# Patient Record
Sex: FEMALE | Race: WHITE | NOT HISPANIC OR LATINO
[De-identification: names, ages, dates, MRNs, and addresses within clinical notes are randomized per-mention and may not be internally consistent; named-entity substitution may affect disease eponyms.]

---

## 2018-08-15 ENCOUNTER — ASOB RESULT (OUTPATIENT)
Age: 28
End: 2018-08-15

## 2018-08-15 ENCOUNTER — APPOINTMENT (OUTPATIENT)
Dept: ANTEPARTUM | Facility: CLINIC | Age: 28
End: 2018-08-15
Payer: MEDICAID

## 2018-08-15 PROCEDURE — 76811 OB US DETAILED SNGL FETUS: CPT

## 2018-08-15 PROCEDURE — 76805 OB US >/= 14 WKS SNGL FETUS: CPT

## 2018-09-05 ENCOUNTER — TRANSCRIPTION ENCOUNTER (OUTPATIENT)
Age: 28
End: 2018-09-05

## 2018-09-06 ENCOUNTER — INPATIENT (INPATIENT)
Facility: HOSPITAL | Age: 28
LOS: 1 days | Discharge: HOME | End: 2018-09-08
Attending: SPECIALIST | Admitting: SPECIALIST

## 2018-09-06 VITALS
DIASTOLIC BLOOD PRESSURE: 51 MMHG | RESPIRATION RATE: 18 BRPM | TEMPERATURE: 99 F | SYSTOLIC BLOOD PRESSURE: 100 MMHG | HEART RATE: 100 BPM | OXYGEN SATURATION: 100 %

## 2018-09-06 DIAGNOSIS — Z3A.00 WEEKS OF GESTATION OF PREGNANCY NOT SPECIFIED: ICD-10-CM

## 2018-09-06 DIAGNOSIS — O42.10 PREMATURE RUPTURE OF MEMBRANES, ONSET OF LABOR MORE THAN 24 HOURS FOLLOWING RUPTURE, UNSPECIFIED WEEKS OF GESTATION: ICD-10-CM

## 2018-09-06 LAB
BASOPHILS # BLD AUTO: 0.01 K/UL — SIGNIFICANT CHANGE UP (ref 0–0.2)
BASOPHILS NFR BLD AUTO: 0.1 % — SIGNIFICANT CHANGE UP (ref 0–2)
BLD GP AB SCN SERPL QL: NEGATIVE — SIGNIFICANT CHANGE UP
EOSINOPHIL # BLD AUTO: 0.07 K/UL — SIGNIFICANT CHANGE UP (ref 0–0.5)
EOSINOPHIL NFR BLD AUTO: 1 % — SIGNIFICANT CHANGE UP (ref 0–6)
HCT VFR BLD CALC: 39.6 % — SIGNIFICANT CHANGE UP (ref 34.5–45)
HGB BLD-MCNC: 13.1 G/DL — SIGNIFICANT CHANGE UP (ref 11.5–15.5)
IMM GRANULOCYTES # BLD AUTO: 0.02 # — SIGNIFICANT CHANGE UP
IMM GRANULOCYTES NFR BLD AUTO: 0.3 % — SIGNIFICANT CHANGE UP (ref 0–1.5)
LYMPHOCYTES # BLD AUTO: 1.42 K/UL — SIGNIFICANT CHANGE UP (ref 1–3.3)
LYMPHOCYTES # BLD AUTO: 19.7 % — SIGNIFICANT CHANGE UP (ref 13–44)
MCHC RBC-ENTMCNC: 31.1 PG — SIGNIFICANT CHANGE UP (ref 27–34)
MCHC RBC-ENTMCNC: 33.1 % — SIGNIFICANT CHANGE UP (ref 32–36)
MCV RBC AUTO: 94.1 FL — SIGNIFICANT CHANGE UP (ref 80–100)
MONOCYTES # BLD AUTO: 0.64 K/UL — SIGNIFICANT CHANGE UP (ref 0–0.9)
MONOCYTES NFR BLD AUTO: 8.9 % — SIGNIFICANT CHANGE UP (ref 2–14)
NEUTROPHILS # BLD AUTO: 5.06 K/UL — SIGNIFICANT CHANGE UP (ref 1.8–7.4)
NEUTROPHILS NFR BLD AUTO: 70 % — SIGNIFICANT CHANGE UP (ref 43–77)
NRBC # FLD: 0 — SIGNIFICANT CHANGE UP
PLATELET # BLD AUTO: 127 K/UL — LOW (ref 150–400)
PMV BLD: 12.6 FL — SIGNIFICANT CHANGE UP (ref 7–13)
RBC # BLD: 4.21 M/UL — SIGNIFICANT CHANGE UP (ref 3.8–5.2)
RBC # FLD: 12.5 % — SIGNIFICANT CHANGE UP (ref 10.3–14.5)
RH IG SCN BLD-IMP: POSITIVE — SIGNIFICANT CHANGE UP
T PALLIDUM AB TITR SER: NEGATIVE — SIGNIFICANT CHANGE UP
WBC # BLD: 7.22 K/UL — SIGNIFICANT CHANGE UP (ref 3.8–10.5)
WBC # FLD AUTO: 7.22 K/UL — SIGNIFICANT CHANGE UP (ref 3.8–10.5)

## 2018-09-06 RX ORDER — PRAMOXINE HYDROCHLORIDE 150 MG/15G
1 AEROSOL, FOAM RECTAL EVERY 4 HOURS
Qty: 0 | Refills: 0 | Status: DISCONTINUED | OUTPATIENT
Start: 2018-09-06 | End: 2018-09-08

## 2018-09-06 RX ORDER — AER TRAVELER 0.5 G/1
1 SOLUTION RECTAL; TOPICAL EVERY 4 HOURS
Qty: 0 | Refills: 0 | Status: DISCONTINUED | OUTPATIENT
Start: 2018-09-06 | End: 2018-09-08

## 2018-09-06 RX ORDER — AMPICILLIN TRIHYDRATE 250 MG
2 CAPSULE ORAL ONCE
Qty: 0 | Refills: 0 | Status: COMPLETED | OUTPATIENT
Start: 2018-09-06 | End: 2018-09-06

## 2018-09-06 RX ORDER — AMPICILLIN TRIHYDRATE 250 MG
1 CAPSULE ORAL EVERY 4 HOURS
Qty: 0 | Refills: 0 | Status: DISCONTINUED | OUTPATIENT
Start: 2018-09-06 | End: 2018-09-06

## 2018-09-06 RX ORDER — SODIUM CHLORIDE 9 MG/ML
3 INJECTION INTRAMUSCULAR; INTRAVENOUS; SUBCUTANEOUS EVERY 8 HOURS
Qty: 0 | Refills: 0 | Status: DISCONTINUED | OUTPATIENT
Start: 2018-09-06 | End: 2018-09-06

## 2018-09-06 RX ORDER — ACETAMINOPHEN 500 MG
975 TABLET ORAL EVERY 6 HOURS
Qty: 0 | Refills: 0 | Status: COMPLETED | OUTPATIENT
Start: 2018-09-06 | End: 2019-08-05

## 2018-09-06 RX ORDER — DIPHENHYDRAMINE HCL 50 MG
25 CAPSULE ORAL EVERY 6 HOURS
Qty: 0 | Refills: 0 | Status: DISCONTINUED | OUTPATIENT
Start: 2018-09-06 | End: 2018-09-08

## 2018-09-06 RX ORDER — KETOROLAC TROMETHAMINE 30 MG/ML
30 SYRINGE (ML) INJECTION ONCE
Qty: 0 | Refills: 0 | Status: DISCONTINUED | OUTPATIENT
Start: 2018-09-06 | End: 2018-09-06

## 2018-09-06 RX ORDER — HYDROCORTISONE 1 %
1 OINTMENT (GRAM) TOPICAL EVERY 4 HOURS
Qty: 0 | Refills: 0 | Status: DISCONTINUED | OUTPATIENT
Start: 2018-09-06 | End: 2018-09-06

## 2018-09-06 RX ORDER — GLYCERIN ADULT
1 SUPPOSITORY, RECTAL RECTAL AT BEDTIME
Qty: 0 | Refills: 0 | Status: DISCONTINUED | OUTPATIENT
Start: 2018-09-06 | End: 2018-09-08

## 2018-09-06 RX ORDER — AMPICILLIN TRIHYDRATE 250 MG
CAPSULE ORAL
Qty: 0 | Refills: 0 | Status: DISCONTINUED | OUTPATIENT
Start: 2018-09-06 | End: 2018-09-06

## 2018-09-06 RX ORDER — PRAMOXINE HYDROCHLORIDE 150 MG/15G
1 AEROSOL, FOAM RECTAL EVERY 4 HOURS
Qty: 0 | Refills: 0 | Status: DISCONTINUED | OUTPATIENT
Start: 2018-09-06 | End: 2018-09-06

## 2018-09-06 RX ORDER — OXYTOCIN 10 UNIT/ML
333.33 VIAL (ML) INJECTION
Qty: 20 | Refills: 0 | Status: DISCONTINUED | OUTPATIENT
Start: 2018-09-06 | End: 2018-09-06

## 2018-09-06 RX ORDER — TETANUS TOXOID, REDUCED DIPHTHERIA TOXOID AND ACELLULAR PERTUSSIS VACCINE, ADSORBED 5; 2.5; 8; 8; 2.5 [IU]/.5ML; [IU]/.5ML; UG/.5ML; UG/.5ML; UG/.5ML
0.5 SUSPENSION INTRAMUSCULAR ONCE
Qty: 0 | Refills: 0 | Status: DISCONTINUED | OUTPATIENT
Start: 2018-09-06 | End: 2018-09-08

## 2018-09-06 RX ORDER — HYDROCORTISONE 1 %
1 OINTMENT (GRAM) TOPICAL EVERY 4 HOURS
Qty: 0 | Refills: 0 | Status: DISCONTINUED | OUTPATIENT
Start: 2018-09-06 | End: 2018-09-08

## 2018-09-06 RX ORDER — DIBUCAINE 1 %
1 OINTMENT (GRAM) RECTAL EVERY 4 HOURS
Qty: 0 | Refills: 0 | Status: DISCONTINUED | OUTPATIENT
Start: 2018-09-06 | End: 2018-09-06

## 2018-09-06 RX ORDER — IBUPROFEN 200 MG
600 TABLET ORAL EVERY 6 HOURS
Qty: 0 | Refills: 0 | Status: DISCONTINUED | OUTPATIENT
Start: 2018-09-06 | End: 2018-09-08

## 2018-09-06 RX ORDER — AER TRAVELER 0.5 G/1
1 SOLUTION RECTAL; TOPICAL EVERY 4 HOURS
Qty: 0 | Refills: 0 | Status: DISCONTINUED | OUTPATIENT
Start: 2018-09-06 | End: 2018-09-06

## 2018-09-06 RX ORDER — DOCUSATE SODIUM 100 MG
100 CAPSULE ORAL
Qty: 0 | Refills: 0 | Status: DISCONTINUED | OUTPATIENT
Start: 2018-09-06 | End: 2018-09-08

## 2018-09-06 RX ORDER — ACETAMINOPHEN 500 MG
975 TABLET ORAL EVERY 6 HOURS
Qty: 0 | Refills: 0 | Status: DISCONTINUED | OUTPATIENT
Start: 2018-09-06 | End: 2018-09-08

## 2018-09-06 RX ORDER — SODIUM CHLORIDE 9 MG/ML
1000 INJECTION, SOLUTION INTRAVENOUS ONCE
Qty: 0 | Refills: 0 | Status: COMPLETED | OUTPATIENT
Start: 2018-09-06 | End: 2018-09-06

## 2018-09-06 RX ORDER — MAGNESIUM HYDROXIDE 400 MG/1
30 TABLET, CHEWABLE ORAL
Qty: 0 | Refills: 0 | Status: DISCONTINUED | OUTPATIENT
Start: 2018-09-06 | End: 2018-09-08

## 2018-09-06 RX ORDER — DIBUCAINE 1 %
1 OINTMENT (GRAM) RECTAL EVERY 4 HOURS
Qty: 0 | Refills: 0 | Status: DISCONTINUED | OUTPATIENT
Start: 2018-09-06 | End: 2018-09-08

## 2018-09-06 RX ORDER — IBUPROFEN 200 MG
600 TABLET ORAL EVERY 6 HOURS
Qty: 0 | Refills: 0 | Status: COMPLETED | OUTPATIENT
Start: 2018-09-06 | End: 2019-08-05

## 2018-09-06 RX ORDER — SIMETHICONE 80 MG/1
80 TABLET, CHEWABLE ORAL EVERY 6 HOURS
Qty: 0 | Refills: 0 | Status: DISCONTINUED | OUTPATIENT
Start: 2018-09-06 | End: 2018-09-08

## 2018-09-06 RX ORDER — OXYCODONE HYDROCHLORIDE 5 MG/1
5 TABLET ORAL EVERY 4 HOURS
Qty: 0 | Refills: 0 | Status: DISCONTINUED | OUTPATIENT
Start: 2018-09-06 | End: 2018-09-08

## 2018-09-06 RX ORDER — OXYTOCIN 10 UNIT/ML
333.33 VIAL (ML) INJECTION
Qty: 20 | Refills: 0 | Status: COMPLETED | OUTPATIENT
Start: 2018-09-06

## 2018-09-06 RX ORDER — OXYCODONE HYDROCHLORIDE 5 MG/1
5 TABLET ORAL
Qty: 0 | Refills: 0 | Status: DISCONTINUED | OUTPATIENT
Start: 2018-09-06 | End: 2018-09-08

## 2018-09-06 RX ORDER — OXYTOCIN 10 UNIT/ML
41.67 VIAL (ML) INJECTION
Qty: 20 | Refills: 0 | Status: DISCONTINUED | OUTPATIENT
Start: 2018-09-06 | End: 2018-09-06

## 2018-09-06 RX ORDER — LANOLIN
1 OINTMENT (GRAM) TOPICAL EVERY 6 HOURS
Qty: 0 | Refills: 0 | Status: DISCONTINUED | OUTPATIENT
Start: 2018-09-06 | End: 2018-09-08

## 2018-09-06 RX ORDER — SODIUM CHLORIDE 9 MG/ML
1000 INJECTION, SOLUTION INTRAVENOUS
Qty: 0 | Refills: 0 | Status: DISCONTINUED | OUTPATIENT
Start: 2018-09-06 | End: 2018-09-06

## 2018-09-06 RX ORDER — SODIUM CHLORIDE 9 MG/ML
3 INJECTION INTRAMUSCULAR; INTRAVENOUS; SUBCUTANEOUS EVERY 8 HOURS
Qty: 0 | Refills: 0 | Status: DISCONTINUED | OUTPATIENT
Start: 2018-09-06 | End: 2018-09-08

## 2018-09-06 RX ADMIN — Medication 975 MILLIGRAM(S): at 21:53

## 2018-09-06 RX ADMIN — Medication 975 MILLIGRAM(S): at 22:20

## 2018-09-06 RX ADMIN — Medication 216 GRAM(S): at 01:58

## 2018-09-06 RX ADMIN — Medication 25 MILLIGRAM(S): at 10:30

## 2018-09-06 RX ADMIN — SODIUM CHLORIDE 3 MILLILITER(S): 9 INJECTION INTRAMUSCULAR; INTRAVENOUS; SUBCUTANEOUS at 14:50

## 2018-09-06 RX ADMIN — Medication 30 MILLIGRAM(S): at 05:10

## 2018-09-06 RX ADMIN — Medication 1 TABLET(S): at 13:03

## 2018-09-06 RX ADMIN — SODIUM CHLORIDE 2000 MILLILITER(S): 9 INJECTION, SOLUTION INTRAVENOUS at 02:03

## 2018-09-06 RX ADMIN — Medication 975 MILLIGRAM(S): at 16:05

## 2018-09-06 RX ADMIN — Medication 600 MILLIGRAM(S): at 17:50

## 2018-09-06 RX ADMIN — Medication 975 MILLIGRAM(S): at 15:35

## 2018-09-06 RX ADMIN — Medication 1000 MILLIUNIT(S)/MIN: at 03:30

## 2018-09-06 RX ADMIN — Medication 600 MILLIGRAM(S): at 17:20

## 2018-09-06 RX ADMIN — Medication 125 MILLIUNIT(S)/MIN: at 03:54

## 2018-09-06 NOTE — DISCHARGE NOTE OB - MATERIALS PROVIDED
[place]  Immunization Record/Breastfeeding Log/Breastfeeding Guide and Packet/Breastfeeding Mother’s Support Group Information/Guide to Postpartum Care/Discharge Medication Information for Patients and Families Pocket Guide/[facility] Hearing Screen Program/MMR Vaccination (VIS Pub Date: 2012)/Tdap Vaccination (VIS Pub Date: 2012)/Birth Certificate Instructions/[facility] [place] Screening Program/Bottle Feeding Log/Shaken Baby Prevention Handout

## 2018-09-06 NOTE — DISCHARGE NOTE OB - PATIENT PORTAL LINK FT
You can access the [facility] Patient Portal, offered by [facility], by registering with the following website: [email or web address]

## 2018-09-06 NOTE — DISCHARGE NOTE OB - CARE PROVIDER_API CALL
Elissa Quesada (MD), Obstetrics and Gynecology  [address] [place] [address]  Phone: [phone]  Fax: [phone]

## 2018-09-07 RX ADMIN — Medication 975 MILLIGRAM(S): at 04:32

## 2018-09-07 RX ADMIN — Medication 600 MILLIGRAM(S): at 11:41

## 2018-09-07 RX ADMIN — Medication 600 MILLIGRAM(S): at 18:53

## 2018-09-07 RX ADMIN — Medication 975 MILLIGRAM(S): at 12:10

## 2018-09-07 RX ADMIN — Medication 975 MILLIGRAM(S): at 19:20

## 2018-09-07 RX ADMIN — Medication 600 MILLIGRAM(S): at 04:32

## 2018-09-07 RX ADMIN — Medication 600 MILLIGRAM(S): at 03:50

## 2018-09-07 RX ADMIN — Medication 975 MILLIGRAM(S): at 18:52

## 2018-09-07 RX ADMIN — Medication 975 MILLIGRAM(S): at 03:50

## 2018-09-07 RX ADMIN — Medication 600 MILLIGRAM(S): at 12:10

## 2018-09-07 RX ADMIN — Medication 1 TABLET(S): at 11:41

## 2018-09-07 RX ADMIN — Medication 975 MILLIGRAM(S): at 11:41

## 2018-09-07 RX ADMIN — Medication 600 MILLIGRAM(S): at 19:20

## 2018-09-07 NOTE — PROGRESS NOTE ADULT - SUBJECTIVE AND OBJECTIVE BOX
ICU Vital Signs Last 24 Hrs  T(C): 36.7 (07 Sep 2018 06:00), Max: 36.7 (07 Sep 2018 06:00)  T(F): 98.1 (07 Sep 2018 06:00), Max: 98.1 (07 Sep 2018 06:00)  HR: 77 (07 Sep 2018 06:00) (77 - 86)  BP: 103/73 (07 Sep 2018 06:00) (103/73 - 110/57)  BP(mean): --  ABP: --  ABP(mean): --  RR: 16 (07 Sep 2018 06:00) (16 - 16)  SpO2: 99% (07 Sep 2018 06:00) (99% - 100%)    Post Epidural d/c follow-up:     VSS.  Pt able to ambulate and freely void.  Denies headache/discomfort.  States no concerns at this time.

## 2018-09-08 VITALS
RESPIRATION RATE: 18 BRPM | SYSTOLIC BLOOD PRESSURE: 109 MMHG | TEMPERATURE: 98 F | HEART RATE: 77 BPM | OXYGEN SATURATION: 99 % | DIASTOLIC BLOOD PRESSURE: 69 MMHG

## 2018-09-08 LAB
APPEARANCE UR: CLEAR — SIGNIFICANT CHANGE UP
BACTERIA # UR AUTO: NEGATIVE — SIGNIFICANT CHANGE UP
BILIRUB UR-MCNC: NEGATIVE — SIGNIFICANT CHANGE UP
BLOOD UR QL VISUAL: HIGH
COLOR SPEC: SIGNIFICANT CHANGE UP
GLUCOSE UR-MCNC: NEGATIVE — SIGNIFICANT CHANGE UP
HYALINE CASTS # UR AUTO: NEGATIVE — SIGNIFICANT CHANGE UP
KETONES UR-MCNC: NEGATIVE — SIGNIFICANT CHANGE UP
LEUKOCYTE ESTERASE UR-ACNC: SIGNIFICANT CHANGE UP
NITRITE UR-MCNC: NEGATIVE — SIGNIFICANT CHANGE UP
PH UR: 8 — SIGNIFICANT CHANGE UP (ref 5–8)
PROT UR-MCNC: 10 — SIGNIFICANT CHANGE UP
RBC CASTS # UR COMP ASSIST: >50 — HIGH (ref 0–?)
SP GR SPEC: 1.01 — SIGNIFICANT CHANGE UP (ref 1–1.04)
SQUAMOUS # UR AUTO: SIGNIFICANT CHANGE UP
UROBILINOGEN FLD QL: NORMAL — SIGNIFICANT CHANGE UP
WBC UR QL: HIGH (ref 0–?)

## 2018-09-08 RX ORDER — ACETAMINOPHEN 500 MG
3 TABLET ORAL
Qty: 0 | Refills: 0 | DISCHARGE
Start: 2018-09-08

## 2018-09-08 RX ORDER — IBUPROFEN 200 MG
1 TABLET ORAL
Qty: 0 | Refills: 0 | DISCHARGE
Start: 2018-09-08

## 2018-09-08 RX ADMIN — Medication 600 MILLIGRAM(S): at 04:00

## 2018-09-08 RX ADMIN — MAGNESIUM HYDROXIDE 30 MILLILITER(S): 400 TABLET, CHEWABLE ORAL at 09:44

## 2018-09-08 RX ADMIN — Medication 600 MILLIGRAM(S): at 09:45

## 2018-09-08 RX ADMIN — Medication 600 MILLIGRAM(S): at 03:23

## 2018-09-08 RX ADMIN — Medication 975 MILLIGRAM(S): at 15:50

## 2018-09-08 RX ADMIN — Medication 600 MILLIGRAM(S): at 10:15

## 2018-09-08 RX ADMIN — Medication 975 MILLIGRAM(S): at 03:23

## 2018-09-08 RX ADMIN — Medication 600 MILLIGRAM(S): at 15:50

## 2018-09-08 RX ADMIN — Medication 600 MILLIGRAM(S): at 16:20

## 2018-09-08 RX ADMIN — Medication 975 MILLIGRAM(S): at 09:44

## 2018-09-08 RX ADMIN — Medication 975 MILLIGRAM(S): at 16:20

## 2018-09-08 RX ADMIN — Medication 975 MILLIGRAM(S): at 10:15

## 2018-09-08 RX ADMIN — Medication 975 MILLIGRAM(S): at 04:00

## 2018-09-08 RX ADMIN — Medication 1 TABLET(S): at 12:55

## 2018-09-08 NOTE — PROVIDER CONTACT NOTE (OTHER) - ASSESSMENT
Patient c/o mild burning sensation when she urinates. As per patient, she had UTI after she delivered vaginally on year 2015. Afebrile. No other complaints made. Fundus firm and at umbilicus with light bleeding noted.

## 2018-09-08 NOTE — PROVIDER CONTACT NOTE (OTHER) - BACKGROUND
NSD on 9/6/18 at 03:27, para 2002, NKA, 40 weeks gestation, EBL=300. H/O UTI post-partum on 2015 (NSD).

## 2019-02-20 ENCOUNTER — RESULT REVIEW (OUTPATIENT)
Age: 29
End: 2019-02-20

## 2020-02-19 ENCOUNTER — RESULT REVIEW (OUTPATIENT)
Age: 30
End: 2020-02-19

## 2021-03-03 ENCOUNTER — RESULT REVIEW (OUTPATIENT)
Age: 31
End: 2021-03-03

## 2022-03-12 NOTE — DISCHARGE NOTE OB - DISCHARGE DISPOSITION
The patient is Stable - Low risk of patient condition declining or worsening    Shift Goals  Clinical Goals: safety, comfort  Patient Goals: rest, sleep well  Family Goals: not present    Progress made toward(s) clinical / shift goals:  yes  Pain controlled with PRN and scheduled medications.  Problem: Pain - Standard  Goal: Alleviation of pain or a reduction in pain to the patient’s comfort goal  Outcome: Progressing   Regular skin repositions, pad and purwick changed regularly.   Problem: Skin Integrity  Goal: Skin integrity is maintained or improved  Outcome: Progressing        Home/with Baby

## 2022-04-27 ENCOUNTER — RESULT REVIEW (OUTPATIENT)
Age: 32
End: 2022-04-27

## 2022-11-04 ENCOUNTER — ASOB RESULT (OUTPATIENT)
Age: 32
End: 2022-11-04

## 2022-11-04 ENCOUNTER — APPOINTMENT (OUTPATIENT)
Dept: OBGYN | Facility: CLINIC | Age: 32
End: 2022-11-04

## 2022-11-04 PROCEDURE — 76817 TRANSVAGINAL US OBSTETRIC: CPT

## 2022-11-11 ENCOUNTER — OUTPATIENT (OUTPATIENT)
Dept: OUTPATIENT SERVICES | Facility: HOSPITAL | Age: 32
LOS: 1 days | End: 2022-11-11

## 2022-11-11 VITALS
DIASTOLIC BLOOD PRESSURE: 72 MMHG | SYSTOLIC BLOOD PRESSURE: 108 MMHG | OXYGEN SATURATION: 100 % | RESPIRATION RATE: 20 BRPM | TEMPERATURE: 97 F | HEIGHT: 65 IN | HEART RATE: 91 BPM | WEIGHT: 119.93 LBS

## 2022-11-11 DIAGNOSIS — O03.4 INCOMPLETE SPONTANEOUS ABORTION WITHOUT COMPLICATION: ICD-10-CM

## 2022-11-11 LAB
APPEARANCE UR: CLEAR — SIGNIFICANT CHANGE UP
BILIRUB UR-MCNC: NEGATIVE — SIGNIFICANT CHANGE UP
BLD GP AB SCN SERPL QL: NEGATIVE — SIGNIFICANT CHANGE UP
COLOR SPEC: COLORLESS — SIGNIFICANT CHANGE UP
DIFF PNL FLD: NEGATIVE — SIGNIFICANT CHANGE UP
GLUCOSE UR QL: NEGATIVE — SIGNIFICANT CHANGE UP
HCT VFR BLD CALC: 40 % — SIGNIFICANT CHANGE UP (ref 34.5–45)
HGB BLD-MCNC: 12.9 G/DL — SIGNIFICANT CHANGE UP (ref 11.5–15.5)
KETONES UR-MCNC: NEGATIVE — SIGNIFICANT CHANGE UP
LEUKOCYTE ESTERASE UR-ACNC: NEGATIVE — SIGNIFICANT CHANGE UP
MANUAL SMEAR VERIFICATION: SIGNIFICANT CHANGE UP
MCHC RBC-ENTMCNC: 30.2 PG — SIGNIFICANT CHANGE UP (ref 27–34)
MCHC RBC-ENTMCNC: 32.3 GM/DL — SIGNIFICANT CHANGE UP (ref 32–36)
MCV RBC AUTO: 93.7 FL — SIGNIFICANT CHANGE UP (ref 80–100)
NITRITE UR-MCNC: NEGATIVE — SIGNIFICANT CHANGE UP
NRBC # BLD: 0 /100 WBCS — SIGNIFICANT CHANGE UP (ref 0–0)
NRBC # FLD: 0 K/UL — SIGNIFICANT CHANGE UP (ref 0–0)
PH UR: 6.5 — SIGNIFICANT CHANGE UP (ref 5–8)
PLAT MORPH BLD: ABNORMAL
PLATELET # BLD AUTO: SIGNIFICANT CHANGE UP K/UL (ref 150–400)
PLATELET CLUMP BLD QL SMEAR: ABNORMAL
PLATELET COUNT - ESTIMATE: ABNORMAL
PROT UR-MCNC: NEGATIVE — SIGNIFICANT CHANGE UP
RBC # BLD: 4.27 M/UL — SIGNIFICANT CHANGE UP (ref 3.8–5.2)
RBC # FLD: 12 % — SIGNIFICANT CHANGE UP (ref 10.3–14.5)
RBC BLD AUTO: SIGNIFICANT CHANGE UP
RH IG SCN BLD-IMP: POSITIVE — SIGNIFICANT CHANGE UP
SP GR SPEC: 1.01 — LOW (ref 1.01–1.05)
UROBILINOGEN FLD QL: SIGNIFICANT CHANGE UP
WBC # BLD: 3.69 K/UL — LOW (ref 3.8–10.5)
WBC # FLD AUTO: 3.69 K/UL — LOW (ref 3.8–10.5)

## 2022-11-11 RX ORDER — SODIUM CHLORIDE 9 MG/ML
1000 INJECTION, SOLUTION INTRAVENOUS
Refills: 0 | Status: DISCONTINUED | OUTPATIENT
Start: 2022-11-14 | End: 2022-11-29

## 2022-11-11 NOTE — H&P PST ADULT - HISTORY OF PRESENT ILLNESS
Patient is 32 year old female with pre op dx of incomplete   spontaneous  w/o complication .patient is scheduled dilation vaccuum curettage with sono guide

## 2022-11-11 NOTE — H&P PST ADULT - PROBLEM SELECTOR PLAN 1
Patient tentatively scheduled for dilation vaccuum curettage with sono guidance on 11/14/22    Pre-op instructions provided. Pt given verbal and written instructions with teach back on  Pepcid. Pt verbalized understanding with return demonstration.    Pt has a scheduled preop COVID test.

## 2022-11-11 NOTE — ASU PATIENT PROFILE, ADULT - VISION (WITH CORRECTIVE LENSES IF THE PATIENT USUALLY WEARS THEM):
contacts out/Partially impaired: cannot see medication labels or newsprint, but can see obstacles in path, and the surrounding layout; can count fingers at arm's length

## 2022-11-11 NOTE — H&P PST ADULT - NSANTHOSAYNRD_GEN_A_CORE
No. KAMILA screening performed.  STOP BANG Legend: 0-2 = LOW Risk; 3-4 = INTERMEDIATE Risk; 5-8 = HIGH Risk

## 2022-11-11 NOTE — H&P PST ADULT - ATTENDING COMMENTS
gyn attending    I have discussed with patient procedure R/B/A including but not limited to infection, bleeding, injury to other organs, perforation--patient signed consent. To OR for DVC for blighted ovum/incomplete ab    Madison FREIRE

## 2022-11-11 NOTE — H&P PST ADULT - NSICDXFAMILYHX_GEN_ALL_CORE_FT
FAMILY HISTORY:  Father  Still living? Yes, Estimated age: Age Unknown  FH: lung cancer, Age at diagnosis: Age Unknown

## 2022-11-11 NOTE — H&P PST ADULT - FEMALE-SPECIFIC SYMPTOMS
[FreeTextEntry1] : Constitutional: Well appearing, no distress\par HEENT: Normocephalic Atraumatic\par Psychiatric: Alert and oriented to all spheres, normal mood\par Pulmonary: no respiratory distress\par Abdomen: non-distended\par Vascular/Extremities: no edema, no cyanosis, no clubbing\par \par \par Neurologic: \par CN II-XII grossly intact\par ROM: Full in cervical and lumbar spine\par Palpation: no pain to palpation in cervical spine, no pain to palpation in lumbar spine\par Strength: Full strength in all major muscle groups, no atrophy\par Sensation: Full sensation to light touch in all extremities\par Reflexes: \par               2+ patellar\par               2+ biceps\par               2+ ankle jerk\par              No Aly's\par              No clonus\par              No babinski\par \par Signs:\par SLR negative\par L'hermitte's negative\par \par Gait: toe, heel, tandem fluid\par \par \par  preop x of incomplete

## 2022-11-13 ENCOUNTER — TRANSCRIPTION ENCOUNTER (OUTPATIENT)
Age: 32
End: 2022-11-13

## 2022-11-14 ENCOUNTER — OUTPATIENT (OUTPATIENT)
Dept: OUTPATIENT SERVICES | Facility: HOSPITAL | Age: 32
LOS: 1 days | Discharge: HOME | End: 2022-11-14

## 2022-11-14 ENCOUNTER — TRANSCRIPTION ENCOUNTER (OUTPATIENT)
Age: 32
End: 2022-11-14

## 2022-11-14 ENCOUNTER — RESULT REVIEW (OUTPATIENT)
Age: 32
End: 2022-11-14

## 2022-11-14 VITALS
OXYGEN SATURATION: 100 % | DIASTOLIC BLOOD PRESSURE: 52 MMHG | HEART RATE: 70 BPM | SYSTOLIC BLOOD PRESSURE: 99 MMHG | RESPIRATION RATE: 18 BRPM

## 2022-11-14 VITALS
WEIGHT: 119.93 LBS | RESPIRATION RATE: 18 BRPM | OXYGEN SATURATION: 100 % | DIASTOLIC BLOOD PRESSURE: 60 MMHG | SYSTOLIC BLOOD PRESSURE: 117 MMHG | HEART RATE: 95 BPM | HEIGHT: 65 IN | TEMPERATURE: 97 F

## 2022-11-14 DIAGNOSIS — O03.4 INCOMPLETE SPONTANEOUS ABORTION WITHOUT COMPLICATION: ICD-10-CM

## 2022-11-14 LAB — HCG UR QL: POSITIVE

## 2022-11-14 PROCEDURE — 88305 TISSUE EXAM BY PATHOLOGIST: CPT | Mod: 26

## 2022-11-14 RX ORDER — SODIUM CHLORIDE 9 MG/ML
1000 INJECTION, SOLUTION INTRAVENOUS
Refills: 0 | Status: DISCONTINUED | OUTPATIENT
Start: 2022-11-14 | End: 2022-11-29

## 2022-11-14 NOTE — ASU DISCHARGE PLAN (ADULT/PEDIATRIC) - CALL YOUR DOCTOR IF YOU HAVE ANY OF THE FOLLOWING:
Bleeding that does not stop/Fever greater than (need to indicate Fahrenheit or Celsius)/Wound/Surgical Site with redness, or foul smelling discharge or pus/Nausea and vomiting that does not stop/Unable to urinate/Excessive diarrhea/Inability to tolerate liquids or foods

## 2022-11-14 NOTE — ASU DISCHARGE PLAN (ADULT/PEDIATRIC) - PROVIDER TOKENS
PROVIDER:[TOKEN:[[id number]],FOLLOWUP:[Routine]] PROVIDER:[TOKEN:[[id number]],FOLLOWUP:[2 weeks]]

## 2022-11-14 NOTE — ASU DISCHARGE PLAN (ADULT/PEDIATRIC) - MEDICATION INSTRUCTIONS
975mg of tylenol every 6 hours as needed, 600mg of motrin every 6 hours as needed Alternate Tylenol 975MG (3 REGULAR STRENGTH) AND Motrin 600MG (3 REGULAR STRENGTH) EVERY 3 hours making sure that each dose of Tylenol is 6 hours from previous tylenol dose and each dose of motrin is 6 hours from previous motrin dose.  The  first dose of MOTRIN/IBUPROFEN can be no earlier than 12:30AM and the first dose of TYLENOL/ACETAMINOPHEN INCLUDING PERCOCET/VICODIN AND COLD MEDICINE can be no earlier than 1230AM. The maximum amount of daily Tylenol is 4000MG. Please keep that in mind.

## 2022-11-14 NOTE — BRIEF OPERATIVE NOTE - NSICDXBRIEFPROCEDURE_GEN_ALL_CORE_FT
PROCEDURES:  Exam under anesthesia, pelvic 14-Nov-2022 19:09:46  Leeanne Rush  Dilation and curettage, uterus, using suction, with US guidance 14-Nov-2022 19:10:42  Leeanne Rush

## 2022-11-14 NOTE — BRIEF OPERATIVE NOTE - OPERATION/FINDINGS
EUA revealed 8 wk size anteverted uterus. Dilation and suction curettage performed under U/S guidance. Endometrial curettings sent to pathology. Excellent hemostasis noted.

## 2022-11-14 NOTE — ASU DISCHARGE PLAN (ADULT/PEDIATRIC) - CARE PROVIDER_API CALL
Elissa Quesada)  Obstetrics and Gynecology  [address] [place] [address]  Phone: [phone]  Fax: [phone]  Follow Up Time: Routine   Elissa Quesada)  Obstetrics and Gynecology  [address] [place] [address]  Phone: [phone]  Fax: [phone]  Follow Up Time: 2 weeks

## 2022-11-14 NOTE — ASU DISCHARGE PLAN (ADULT/PEDIATRIC) - NS MD DC FALL RISK RISK
For information on Fall & Injury Prevention, visit: [email or web address] OR  [email or web address]-injury OR  https://www.cdc.gov/steadi/patient.html

## 2022-11-17 LAB — SURGICAL PATHOLOGY STUDY: SIGNIFICANT CHANGE UP

## 2023-06-30 NOTE — PATIENT PROFILE OB - AGENT'S NAME
CC:  Heather Kim is here today for Medicare Wellness Visit       Medications: medications verified and updated    Refills needed today?  NO    Latex allergy or sensitivity: No known latex allergy     Patient would like communication of their results via:      Cell Phone:   Telephone Information:   Mobile [phone]     Okay to leave a message containing results? Yes    Patient's current [facility] status: Active.    Advanced directives: on file    Care Teams Verified: No Changes    Depression Screen: yes    Tobacco history: verified    Health Maintenance Due   Topic Date Due   • Medicare Advantage- Medicare Wellness Visit  01/01/2023       Patient is due for the topics as listed above.   Spencer Amaya

## 2023-07-25 ENCOUNTER — APPOINTMENT (OUTPATIENT)
Dept: ANTEPARTUM | Facility: CLINIC | Age: 33
End: 2023-07-25
Payer: COMMERCIAL

## 2023-07-25 ENCOUNTER — ASOB RESULT (OUTPATIENT)
Age: 33
End: 2023-07-25

## 2023-07-25 PROCEDURE — 76805 OB US >/= 14 WKS SNGL FETUS: CPT

## 2023-08-31 ENCOUNTER — NON-APPOINTMENT (OUTPATIENT)
Age: 33
End: 2023-08-31

## 2023-08-31 ENCOUNTER — APPOINTMENT (OUTPATIENT)
Dept: INFECTIOUS DISEASE | Facility: CLINIC | Age: 33
End: 2023-08-31
Payer: COMMERCIAL

## 2023-08-31 VITALS
HEART RATE: 93 BPM | WEIGHT: 135 LBS | BODY MASS INDEX: 23.92 KG/M2 | HEIGHT: 63 IN | OXYGEN SATURATION: 100 % | DIASTOLIC BLOOD PRESSURE: 71 MMHG | SYSTOLIC BLOOD PRESSURE: 112 MMHG | TEMPERATURE: 98.5 F

## 2023-08-31 DIAGNOSIS — Z34.92 ENCOUNTER FOR SUPERVISION OF NORMAL PREGNANCY, UNSPECIFIED, SECOND TRIMESTER: ICD-10-CM

## 2023-08-31 DIAGNOSIS — Z22.39 CARRIER OF OTHER SPECIFIED BACTERIAL DISEASES: ICD-10-CM

## 2023-08-31 PROCEDURE — 99205 OFFICE O/P NEW HI 60 MIN: CPT

## 2023-09-02 PROBLEM — Z22.39 CARRIER OF UREAPLASMA UREALYTICUM: Status: ACTIVE | Noted: 2023-08-31

## 2023-09-02 PROBLEM — Z34.92 CURRENTLY PREGNANT IN SECOND TRIMESTER WITH UNKNOWN GESTATIONAL AGE: Status: ACTIVE | Noted: 2023-09-02

## 2023-09-02 NOTE — PHYSICAL EXAM
[General Appearance - Alert] : alert [General Appearance - In No Acute Distress] : in no acute distress [Sclera] : the sclera and conjunctiva were normal [PERRL With Normal Accommodation] : pupils were equal in size, round, reactive to light [Extraocular Movements] : extraocular movements were intact [Auscultation Breath Sounds / Voice Sounds] : lungs were clear to auscultation bilaterally [Heart Rate And Rhythm] : heart rate was normal and rhythm regular [Heart Sounds] : normal S1 and S2 [Heart Sounds Gallop] : no gallops [Murmurs] : no murmurs [Heart Sounds Pericardial Friction Rub] : no pericardial rub [Bowel Sounds] : normal bowel sounds [Abdomen Soft] : soft [Abdomen Tenderness] : non-tender [Abdomen Mass (___ Cm)] : no abdominal mass palpated [Costovertebral Angle Tenderness] : no CVA tenderness [Skin Color & Pigmentation] : normal skin color and pigmentation [] : no rash [Oriented To Time, Place, And Person] : oriented to person, place, and time [Affect] : the affect was normal

## 2023-09-02 NOTE — ASSESSMENT
[FreeTextEntry1] : This is a 34 y/o F who is currently 25 weeks pregnant who is presenting to the ID office for Ureaplasma positive in the urine Cx, however does not have any  symptoms at this time.   Pt was been treated with both Erythomycin and Azithromycin (7 day course), however urine culture remains positive despite this. Overall Ureaplasma/Mycoplasma hominis urine cultures are usually done in the setting of urinary symptoms with negative Cx. There is emerging evidence that does relate Ureaplasma infections with possible pre-term labor, however there is not establish evidence for this.   Most patients are asymptomatic and we would not know when pt was colonized with Ureaplasma, The preference at this time would to be treat the patient with medications safe for baby. After speaking to several colleagues from and outside [facility]. and with literature review, the drug of choice for a pregnant patient is Azithromycin. There was a study comparing 1 g dose versus 7 days of 500 mg, which found no difference in eradication. Unfortunately, pt has already been treated and UCx remain positive. I have reached out to Microbiology supervisors and our reference lab does not do suspectibility testing.  This was further discussed with infectious disease pharmacist, and the recommendations for treatment of Ureaplasma include Doxycycline and Fluroquinolones, however both can be teratogenic. There is no role for clindamycin, as it is more effective for Mycoplasma infections.   Spoke in detail with the patient and OB Dr. Quesada. Given there is no other safe antibiotics that would be effective for Ureaplasma infection, would not treat at this time. [Drug Interactions / Side Effects] : drug interactions/side effects

## 2023-09-02 NOTE — HISTORY OF PRESENT ILLNESS
[FreeTextEntry1] : This is a 32 y/o F  currently 25 weeks pregnant who is presenting to infectious diseases clinic for urine Cx with ureaplasma   Pt states that her  was having testicular pain and when initial UCx was negative, Ureaplasma/mycoplasma Cx was sent and was positive. Pt was treated (possibly doxycyline?) and repeat testing was negative.   Our pt was then tested for Mycoplasma/Ureaplasma Cx on 23, she was then treated with one time dose of Erythromycin, a few later test positive again 23. She got 1 dose of Azithromycin 1g, then Azithromycin 500 mg x 7 days. Then tested again, 23 was positive as well.   Pt is currently not any urinary symptoms, flank pain, or any other symptoms. She has 2 healthy children, however had a miscarriage after that. She is concerned as there are reports of ureaplasma causing pre-term labor and is unsure if her previous miscarriage was related to that.

## 2023-09-10 LAB
MYCOPLASMA HOMINIS CULTURE: NEGATIVE
UREAPLASMA CULTURE: POSITIVE

## 2023-12-05 ENCOUNTER — TRANSCRIPTION ENCOUNTER (OUTPATIENT)
Age: 33
End: 2023-12-05

## 2023-12-05 ENCOUNTER — INPATIENT (INPATIENT)
Facility: HOSPITAL | Age: 33
LOS: 1 days | Discharge: HOME | End: 2023-12-07
Attending: SPECIALIST | Admitting: SPECIALIST

## 2023-12-05 VITALS
SYSTOLIC BLOOD PRESSURE: 125 MMHG | HEART RATE: 98 BPM | TEMPERATURE: 98 F | RESPIRATION RATE: 16 BRPM | DIASTOLIC BLOOD PRESSURE: 57 MMHG

## 2023-12-05 DIAGNOSIS — O26.899 OTHER SPECIFIED PREGNANCY RELATED CONDITIONS, UNSPECIFIED TRIMESTER: ICD-10-CM

## 2023-12-05 DIAGNOSIS — Z98.890 OTHER SPECIFIED POSTPROCEDURAL STATES: Chronic | ICD-10-CM

## 2023-12-05 LAB
BASOPHILS # BLD AUTO: 0.01 K/UL — SIGNIFICANT CHANGE UP (ref 0–0.2)
BASOPHILS # BLD AUTO: 0.01 K/UL — SIGNIFICANT CHANGE UP (ref 0–0.2)
BASOPHILS NFR BLD AUTO: 0.1 % — SIGNIFICANT CHANGE UP (ref 0–2)
BASOPHILS NFR BLD AUTO: 0.1 % — SIGNIFICANT CHANGE UP (ref 0–2)
BLD GP AB SCN SERPL QL: NEGATIVE — SIGNIFICANT CHANGE UP
BLD GP AB SCN SERPL QL: NEGATIVE — SIGNIFICANT CHANGE UP
EOSINOPHIL # BLD AUTO: 0.06 K/UL — SIGNIFICANT CHANGE UP (ref 0–0.5)
EOSINOPHIL # BLD AUTO: 0.06 K/UL — SIGNIFICANT CHANGE UP (ref 0–0.5)
EOSINOPHIL NFR BLD AUTO: 0.7 % — SIGNIFICANT CHANGE UP (ref 0–6)
EOSINOPHIL NFR BLD AUTO: 0.7 % — SIGNIFICANT CHANGE UP (ref 0–6)
HCT VFR BLD CALC: 37.5 % — SIGNIFICANT CHANGE UP (ref 34.5–45)
HCT VFR BLD CALC: 37.5 % — SIGNIFICANT CHANGE UP (ref 34.5–45)
HGB BLD-MCNC: 12.4 G/DL — SIGNIFICANT CHANGE UP (ref 11.5–15.5)
HGB BLD-MCNC: 12.4 G/DL — SIGNIFICANT CHANGE UP (ref 11.5–15.5)
IANC: 6.29 K/UL — SIGNIFICANT CHANGE UP (ref 1.8–7.4)
IANC: 6.29 K/UL — SIGNIFICANT CHANGE UP (ref 1.8–7.4)
IMM GRANULOCYTES NFR BLD AUTO: 0.6 % — SIGNIFICANT CHANGE UP (ref 0–0.9)
IMM GRANULOCYTES NFR BLD AUTO: 0.6 % — SIGNIFICANT CHANGE UP (ref 0–0.9)
LYMPHOCYTES # BLD AUTO: 1.51 K/UL — SIGNIFICANT CHANGE UP (ref 1–3.3)
LYMPHOCYTES # BLD AUTO: 1.51 K/UL — SIGNIFICANT CHANGE UP (ref 1–3.3)
LYMPHOCYTES # BLD AUTO: 18 % — SIGNIFICANT CHANGE UP (ref 13–44)
LYMPHOCYTES # BLD AUTO: 18 % — SIGNIFICANT CHANGE UP (ref 13–44)
MCHC RBC-ENTMCNC: 30.8 PG — SIGNIFICANT CHANGE UP (ref 27–34)
MCHC RBC-ENTMCNC: 30.8 PG — SIGNIFICANT CHANGE UP (ref 27–34)
MCHC RBC-ENTMCNC: 33.1 GM/DL — SIGNIFICANT CHANGE UP (ref 32–36)
MCHC RBC-ENTMCNC: 33.1 GM/DL — SIGNIFICANT CHANGE UP (ref 32–36)
MCV RBC AUTO: 93.1 FL — SIGNIFICANT CHANGE UP (ref 80–100)
MCV RBC AUTO: 93.1 FL — SIGNIFICANT CHANGE UP (ref 80–100)
MONOCYTES # BLD AUTO: 0.47 K/UL — SIGNIFICANT CHANGE UP (ref 0–0.9)
MONOCYTES # BLD AUTO: 0.47 K/UL — SIGNIFICANT CHANGE UP (ref 0–0.9)
MONOCYTES NFR BLD AUTO: 5.6 % — SIGNIFICANT CHANGE UP (ref 2–14)
MONOCYTES NFR BLD AUTO: 5.6 % — SIGNIFICANT CHANGE UP (ref 2–14)
NEUTROPHILS # BLD AUTO: 6.29 K/UL — SIGNIFICANT CHANGE UP (ref 1.8–7.4)
NEUTROPHILS # BLD AUTO: 6.29 K/UL — SIGNIFICANT CHANGE UP (ref 1.8–7.4)
NEUTROPHILS NFR BLD AUTO: 75 % — SIGNIFICANT CHANGE UP (ref 43–77)
NEUTROPHILS NFR BLD AUTO: 75 % — SIGNIFICANT CHANGE UP (ref 43–77)
NRBC # BLD: 0 /100 WBCS — SIGNIFICANT CHANGE UP (ref 0–0)
NRBC # BLD: 0 /100 WBCS — SIGNIFICANT CHANGE UP (ref 0–0)
NRBC # FLD: 0 K/UL — SIGNIFICANT CHANGE UP (ref 0–0)
NRBC # FLD: 0 K/UL — SIGNIFICANT CHANGE UP (ref 0–0)
PLATELET # BLD AUTO: 142 K/UL — LOW (ref 150–400)
PLATELET # BLD AUTO: 142 K/UL — LOW (ref 150–400)
RBC # BLD: 4.03 M/UL — SIGNIFICANT CHANGE UP (ref 3.8–5.2)
RBC # BLD: 4.03 M/UL — SIGNIFICANT CHANGE UP (ref 3.8–5.2)
RBC # FLD: 12.6 % — SIGNIFICANT CHANGE UP (ref 10.3–14.5)
RBC # FLD: 12.6 % — SIGNIFICANT CHANGE UP (ref 10.3–14.5)
RH IG SCN BLD-IMP: POSITIVE — SIGNIFICANT CHANGE UP
T PALLIDUM AB TITR SER: NEGATIVE — SIGNIFICANT CHANGE UP
T PALLIDUM AB TITR SER: NEGATIVE — SIGNIFICANT CHANGE UP
WBC # BLD: 8.39 K/UL — SIGNIFICANT CHANGE UP (ref 3.8–10.5)
WBC # BLD: 8.39 K/UL — SIGNIFICANT CHANGE UP (ref 3.8–10.5)
WBC # FLD AUTO: 8.39 K/UL — SIGNIFICANT CHANGE UP (ref 3.8–10.5)
WBC # FLD AUTO: 8.39 K/UL — SIGNIFICANT CHANGE UP (ref 3.8–10.5)

## 2023-12-05 RX ORDER — SODIUM CHLORIDE 9 MG/ML
1000 INJECTION, SOLUTION INTRAVENOUS ONCE
Refills: 0 | Status: DISCONTINUED | OUTPATIENT
Start: 2023-12-05 | End: 2023-12-05

## 2023-12-05 RX ORDER — AMPICILLIN TRIHYDRATE 250 MG
1 CAPSULE ORAL EVERY 4 HOURS
Refills: 0 | Status: DISCONTINUED | OUTPATIENT
Start: 2023-12-05 | End: 2023-12-05

## 2023-12-05 RX ORDER — ACETAMINOPHEN 500 MG
975 TABLET ORAL
Refills: 0 | Status: DISCONTINUED | OUTPATIENT
Start: 2023-12-05 | End: 2023-12-07

## 2023-12-05 RX ORDER — DIPHENHYDRAMINE HCL 50 MG
25 CAPSULE ORAL EVERY 6 HOURS
Refills: 0 | Status: DISCONTINUED | OUTPATIENT
Start: 2023-12-05 | End: 2023-12-07

## 2023-12-05 RX ORDER — DIBUCAINE 1 %
1 OINTMENT (GRAM) RECTAL EVERY 6 HOURS
Refills: 0 | Status: DISCONTINUED | OUTPATIENT
Start: 2023-12-05 | End: 2023-12-07

## 2023-12-05 RX ORDER — SIMETHICONE 80 MG/1
80 TABLET, CHEWABLE ORAL EVERY 4 HOURS
Refills: 0 | Status: DISCONTINUED | OUTPATIENT
Start: 2023-12-05 | End: 2023-12-07

## 2023-12-05 RX ORDER — IBUPROFEN 200 MG
1 TABLET ORAL
Qty: 0 | Refills: 0 | DISCHARGE

## 2023-12-05 RX ORDER — OXYCODONE HYDROCHLORIDE 5 MG/1
5 TABLET ORAL
Refills: 0 | Status: DISCONTINUED | OUTPATIENT
Start: 2023-12-05 | End: 2023-12-07

## 2023-12-05 RX ORDER — TETANUS TOXOID, REDUCED DIPHTHERIA TOXOID AND ACELLULAR PERTUSSIS VACCINE, ADSORBED 5; 2.5; 8; 8; 2.5 [IU]/.5ML; [IU]/.5ML; UG/.5ML; UG/.5ML; UG/.5ML
0.5 SUSPENSION INTRAMUSCULAR ONCE
Refills: 0 | Status: DISCONTINUED | OUTPATIENT
Start: 2023-12-05 | End: 2023-12-07

## 2023-12-05 RX ORDER — ACETAMINOPHEN 500 MG
3 TABLET ORAL
Qty: 0 | Refills: 0 | DISCHARGE

## 2023-12-05 RX ORDER — SODIUM CHLORIDE 9 MG/ML
3 INJECTION INTRAMUSCULAR; INTRAVENOUS; SUBCUTANEOUS EVERY 8 HOURS
Refills: 0 | Status: DISCONTINUED | OUTPATIENT
Start: 2023-12-05 | End: 2023-12-07

## 2023-12-05 RX ORDER — BENZOCAINE 10 %
1 GEL (GRAM) MUCOUS MEMBRANE EVERY 6 HOURS
Refills: 0 | Status: DISCONTINUED | OUTPATIENT
Start: 2023-12-05 | End: 2023-12-07

## 2023-12-05 RX ORDER — KETOROLAC TROMETHAMINE 30 MG/ML
30 SYRINGE (ML) INJECTION ONCE
Refills: 0 | Status: DISCONTINUED | OUTPATIENT
Start: 2023-12-05 | End: 2023-12-05

## 2023-12-05 RX ORDER — SODIUM CHLORIDE 9 MG/ML
1000 INJECTION, SOLUTION INTRAVENOUS
Refills: 0 | Status: DISCONTINUED | OUTPATIENT
Start: 2023-12-05 | End: 2023-12-05

## 2023-12-05 RX ORDER — CHLORHEXIDINE GLUCONATE 213 G/1000ML
1 SOLUTION TOPICAL DAILY
Refills: 0 | Status: DISCONTINUED | OUTPATIENT
Start: 2023-12-05 | End: 2023-12-05

## 2023-12-05 RX ORDER — PRAMOXINE HYDROCHLORIDE 150 MG/15G
1 AEROSOL, FOAM RECTAL EVERY 4 HOURS
Refills: 0 | Status: DISCONTINUED | OUTPATIENT
Start: 2023-12-05 | End: 2023-12-07

## 2023-12-05 RX ORDER — IBUPROFEN 200 MG
600 TABLET ORAL EVERY 6 HOURS
Refills: 0 | Status: COMPLETED | OUTPATIENT
Start: 2023-12-05 | End: 2024-11-02

## 2023-12-05 RX ORDER — HYDROCORTISONE 1 %
1 OINTMENT (GRAM) TOPICAL EVERY 6 HOURS
Refills: 0 | Status: DISCONTINUED | OUTPATIENT
Start: 2023-12-05 | End: 2023-12-07

## 2023-12-05 RX ORDER — ASPIRIN/CALCIUM CARB/MAGNESIUM 324 MG
1 TABLET ORAL
Refills: 0 | DISCHARGE

## 2023-12-05 RX ORDER — OXYCODONE HYDROCHLORIDE 5 MG/1
5 TABLET ORAL ONCE
Refills: 0 | Status: DISCONTINUED | OUTPATIENT
Start: 2023-12-05 | End: 2023-12-07

## 2023-12-05 RX ORDER — AMPICILLIN TRIHYDRATE 250 MG
2 CAPSULE ORAL ONCE
Refills: 0 | Status: COMPLETED | OUTPATIENT
Start: 2023-12-05 | End: 2023-12-05

## 2023-12-05 RX ORDER — AER TRAVELER 0.5 G/1
1 SOLUTION RECTAL; TOPICAL EVERY 4 HOURS
Refills: 0 | Status: DISCONTINUED | OUTPATIENT
Start: 2023-12-05 | End: 2023-12-07

## 2023-12-05 RX ORDER — LANOLIN
1 OINTMENT (GRAM) TOPICAL EVERY 6 HOURS
Refills: 0 | Status: DISCONTINUED | OUTPATIENT
Start: 2023-12-05 | End: 2023-12-07

## 2023-12-05 RX ORDER — MAGNESIUM HYDROXIDE 400 MG/1
30 TABLET, CHEWABLE ORAL
Refills: 0 | Status: DISCONTINUED | OUTPATIENT
Start: 2023-12-05 | End: 2023-12-07

## 2023-12-05 RX ORDER — IBUPROFEN 200 MG
600 TABLET ORAL EVERY 6 HOURS
Refills: 0 | Status: DISCONTINUED | OUTPATIENT
Start: 2023-12-05 | End: 2023-12-07

## 2023-12-05 RX ORDER — OXYTOCIN 10 UNIT/ML
333.33 VIAL (ML) INJECTION
Qty: 20 | Refills: 0 | Status: COMPLETED | OUTPATIENT
Start: 2023-12-05 | End: 2023-12-05

## 2023-12-05 RX ORDER — OXYTOCIN 10 UNIT/ML
41.67 VIAL (ML) INJECTION
Qty: 20 | Refills: 0 | Status: DISCONTINUED | OUTPATIENT
Start: 2023-12-05 | End: 2023-12-06

## 2023-12-05 RX ADMIN — SODIUM CHLORIDE 3 MILLILITER(S): 9 INJECTION INTRAMUSCULAR; INTRAVENOUS; SUBCUTANEOUS at 17:10

## 2023-12-05 RX ADMIN — Medication 30 MILLIGRAM(S): at 15:06

## 2023-12-05 RX ADMIN — Medication 200 GRAM(S): at 06:52

## 2023-12-05 RX ADMIN — Medication 108 GRAM(S): at 10:49

## 2023-12-05 RX ADMIN — SODIUM CHLORIDE 3 MILLILITER(S): 9 INJECTION INTRAMUSCULAR; INTRAVENOUS; SUBCUTANEOUS at 21:00

## 2023-12-05 RX ADMIN — Medication 600 MILLIGRAM(S): at 21:02

## 2023-12-05 RX ADMIN — CHLORHEXIDINE GLUCONATE 1 APPLICATION(S): 213 SOLUTION TOPICAL at 08:17

## 2023-12-05 RX ADMIN — Medication 1000 MILLIUNIT(S)/MIN: at 14:13

## 2023-12-05 RX ADMIN — Medication 975 MILLIGRAM(S): at 18:00

## 2023-12-05 RX ADMIN — Medication 975 MILLIGRAM(S): at 17:13

## 2023-12-05 RX ADMIN — SODIUM CHLORIDE 125 MILLILITER(S): 9 INJECTION, SOLUTION INTRAVENOUS at 06:52

## 2023-12-05 RX ADMIN — Medication 600 MILLIGRAM(S): at 22:00

## 2023-12-05 NOTE — DISCHARGE NOTE OB - BREASTFEEDING PROVIDES STABLE TEMPERATURE THROUGH SKIN TO SKIN CONTACT

## 2023-12-05 NOTE — OB RN PATIENT PROFILE - FALL HARM RISK - UNIVERSAL INTERVENTIONS
Bed in lowest position, wheels locked, appropriate side rails in place/Call bell, personal items and telephone in reach/Instruct patient to call for assistance before getting out of bed or chair/Non-slip footwear when patient is out of bed/[place] to call system/Physically safe environment - no spills, clutter or unnecessary equipment/Purposeful Proactive Rounding/Room/bathroom lighting operational, light cord in reach Bed in lowest position, wheels locked, appropriate side rails in place/Call bell, personal items and telephone in reach/Instruct patient to call for assistance before getting out of bed or chair/Non-slip footwear when patient is out of bed/[place] to call system/Physically safe environment - no spills, clutter or unnecessary equipment/Purposeful Proactive Rounding/Room/bathroom lighting operational, light cord in reach

## 2023-12-05 NOTE — OB PROVIDER H&P - NSLOWPPHRISK_OBGYN_A_OB
No previous uterine incision/[place] Pregnancy/Less than or equal to 4 previous vaginal births/No known bleeding disorder/No history of postpartum hemorrhage/No other PPH risks indicated

## 2023-12-05 NOTE — DISCHARGE NOTE OB - CARE PROVIDER_API CALL
Elissa Quesada  Obstetrics and Gynecology  [address] [place] [address]  Phone: [phone]  Fax: [phone]  Follow Up Time:    Elissa Quesada  Obstetrics and Gynecology  [address] [place] [address]  Phone: [phone]  Fax: [phone]  Follow Up Time:

## 2023-12-05 NOTE — OB RN DELIVERY SUMMARY - NSSELHIDDEN_OBGYN_ALL_OB_FT
[NS_DeliveryAttending1_OBGYN_ALL_OB_FT:[id number]],[NS_DeliveryAssist1_OBGYN_ALL_OB_FT:[id number]],[NS_DeliveryRN_OBGYN_ALL_OB_FT:[id number]] [NS_DeliveryAttending1_OBGYN_ALL_OB_FT:[id number]],[NS_DeliveryAssist1_OBGYN_ALL_OB_FT:[id number]],[NS_DeliveryRN_OBGYN_ALL_OB_FT:[id number]]

## 2023-12-05 NOTE — OB PROVIDER H&P - HISTORY OF PRESENT ILLNESS
34 y/o , EDC , GA 39.3 weeks, presents for contractions since 2:00 AM, every 5 minutes, 7/10 in intensity on numeric pain scale. Pt requests epidural for pain relief. Denies VB/ LOF. Reports good FM.    PNC: Dr. Quesada    AP Course: GBS Positive  32 y/o , EDC , GA 39.3 weeks, presents for contractions since 2:00 AM, every 5 minutes, 7/10 in intensity on numeric pain scale. Pt requests epidural for pain relief. Denies VB/ LOF. Reports good FM.    PNC: Dr. Quesada    AP Course: GBS Positive

## 2023-12-05 NOTE — OB RN DELIVERY SUMMARY - NS_SEPSISRSKCALC_OBGYN_ALL_OB_FT
EOS calculated successfully. EOS Risk Factor: 0.5/1000 live births ([facility] national incidence); GA=39w3d; Temp=98.42; ROM=5.933; GBS='Positive'; Antibiotics='GBS specific antibiotics > 2 hrs prior to birth'   EOS calculated successfully. EOS Risk Factor: 0.5/1000 live births ([facility] national incidence); GA=39w3d; Temp=98.42; ROM=5.933; GBS='Positive'; Antibiotics='GBS specific antibiotics > 2 hrs prior to birth'

## 2023-12-05 NOTE — OB PROVIDER LABOR PROGRESS NOTE - ASSESSMENT
A/P: 34y/o  @39w3d in labor  - Labor: SROM@7a, expectant management   - Fetus: category 1  - GBS: negative   - Pain: epidural     Melissa Jama, PGY1  d/w Dr. Monahan   A/P: 32y/o  @39w3d in labor  - Labor: SROM@7a, expectant management   - Fetus: category 1  - GBS: +, Amp  - Pain: epidural     Will start pushing.     Melissa Jama, PGY1  d/w Dr. Monahan   A/P: 34y/o  @39w3d in labor  - Labor: SROM@7a, expectant management   - Fetus: category 1  - GBS: +, Amp  - Pain: epidural     Will start pushing.     Melissa Jama, PGY1  d/w Dr. Monahan

## 2023-12-05 NOTE — OB PROVIDER LABOR PROGRESS NOTE - ASSESSMENT
#Labor   - s/p SROM @ ~7a, clear  - Anticipate   - Will continue w/ expectant management     #Fetal Wellbeing   - Cat 1    #Pain Control   - w/ Epi    Ryder Vazquez, PGY-2    d/w Dr. MARCELO Monahan

## 2023-12-05 NOTE — OB PROVIDER DELIVERY SUMMARY - NSPROVIDERDELIVERYNOTE_OBGYN_ALL_OB_FT
Spontaneous vaginal delivery of liveborn male infant from OA position, APGARS 9/9. Head, shoulders, and body delivered easily. Infant was suctioned. No meconium. 1 minute delayed cord clamping was performed. Cord clamped and cut and infant passed to mother. Placenta delivered intact with a 3 vessel cord. Fundal massage was given and uterine fundus was found to be firm. Vaginal exam revealed an intact cervix, vaginal walls, sulci, and perineum. Patient was stable. Count was correct x2.     Melissa Jama  PGY-1

## 2023-12-05 NOTE — OB PROVIDER LABOR PROGRESS NOTE - NS_SUBJECTIVE/OBJECTIVE_OBGYN_ALL_OB_FT
R1 OB Labor Note    S: Patient seen and examined at bedside.     T(C): 36.9 (12-05-23 @ 12:09), Max: 36.9 (12-05-23 @ 06:10)  HR: 107 (12-05-23 @ 12:28) (74 - 128)  BP: 105/53 (12-05-23 @ 12:25) (88/52 - 164/64)  BP(mean): --  ABP: --  ABP(mean): --  RR: 18 (12-05-23 @ 12:09) (16 - 18)  SpO2: 99% (12-05-23 @ 12:28) (91% - 100%)  Wt(kg): --  CVP(mm Hg): --  CI: --  CAPILLARY BLOOD GLUCOSE       N/A      12-04 @ 07:01  -  12-05 @ 07:00  --------------------------------------------------------  IN:    Lactated Ringers: 125 mL  Total IN: 125 mL    OUT:  Total OUT: 0 mL    Total NET: 125 mL      12-05 @ 07:01  -  12-05 @ 12:33  --------------------------------------------------------  IN:    Lactated Ringers: 375 mL  Total IN: 375 mL    OUT:  Total OUT: 0 mL    Total NET: 375 mL
Labor & Delivery Progress Note   (Entry delayed secondary to clinical duties)     Pt examined @1028 due to increased discomfort     T(C): 36.9 (12-05-23 @ 12:09), Max: 36.9 (12-05-23 @ 06:10)  HR: 109 (12-05-23 @ 12:40) (74 - 128)  BP: 105/53 (12-05-23 @ 12:25) (88/52 - 164/64)  RR: 18 (12-05-23 @ 12:09) (16 - 18)  SpO2: 97% (12-05-23 @ 12:38) (91% - 100%)

## 2023-12-05 NOTE — OB RN TRIAGE NOTE - FALL HARM RISK - UNIVERSAL INTERVENTIONS
Bed in lowest position, wheels locked, appropriate side rails in place/Call bell, personal items and telephone in reach/Instruct patient to call for assistance before getting out of bed or chair/Non-slip footwear when patient is out of bed/[place] to call system/Physically safe environment - no spills, clutter or unnecessary equipment/Purposeful Proactive Rounding/Room/bathroom lighting operational, light cord in reach Bed in lowest position, wheels locked, appropriate side rails in place/Call bell, personal items and telephone in reach/Instruct patient to call for assistance before getting out of bed or chair/Non-slip footwear when patient is out of bed/[place] to call system/Physically safe environment - no spills, clutter or unnecessary equipment/Purposeful Proactive Rounding/Room/bathroom lighting operational, light cord in reach

## 2023-12-05 NOTE — DISCHARGE NOTE OB - PATIENT PORTAL LINK FT
You can access the FollowMyHealth Patient Portal offered by [facility] by registering at the following website: [email or web address]. By joining [employer]’s FollowMyHealth portal, you will also be able to view your health information using other applications (apps) compatible with our system. You can access the FollowMyHealth Patient Portal offered by [facility] by registering at the following website: [email or web address]. By joining [employer]’s FollowMyHealth portal, you will also be able to view your health information using other applications (apps) compatible with our system.

## 2023-12-05 NOTE — DISCHARGE NOTE OB - MEDICATION SUMMARY - MEDICATIONS TO TAKE
I will START or STAY ON the medications listed below when I get home from the hospital:  None I will START or STAY ON the medications listed below when I get home from the hospital:    ibuprofen 600 mg oral tablet  -- 1 tab(s) by mouth every 6 hours as needed for  moderate pain  -- Indication: For  (spontaneous vaginal delivery)    acetaminophen 325 mg oral tablet  -- 3 tab(s) by mouth every 6 hours as needed for  mild pain  -- Indication: For  (spontaneous vaginal delivery)    Prenatal Multivitamins with Folic Acid 1 mg oral tablet  -- 1 tab(s) by mouth once a day  -- Indication: For  (spontaneous vaginal delivery)

## 2023-12-05 NOTE — OB PROVIDER DELIVERY SUMMARY - NSSELHIDDEN_OBGYN_ALL_OB_FT
[NS_DeliveryAttending1_OBGYN_ALL_OB_FT:[id number]],[NS_DeliveryAssist1_OBGYN_ALL_OB_FT:[id number]],[NS_DeliveryRN_OBGYN_ALL_OB_FT:[id number]] [NS_DeliveryAttending1_OBGYN_ALL_OB_FT:[id number]],[NS_DeliveryAssist1_OBGYN_ALL_OB_FT:[id number]=],[NS_DeliveryRN_OBGYN_ALL_OB_FT:[id number]]

## 2023-12-05 NOTE — DISCHARGE NOTE OB - NS MD DC FALL RISK RISK
For information on Fall & Injury Prevention, visit: [email or web address].[facility]/[email or web address]-maintains-health-[email or web address]-mobility OR  https://www.[facility]/news/fall-prevention-tips-to-avoid-injury OR  https://www.cdc.gov/steadi/patient.html For information on Fall & Injury Prevention, visit: [email or web address]://www.[facility]/news/fall-prevention-protects-and-maintains-health-and-mobility OR  https://www.[facility]/news/fall-prevention-tips-to-avoid-injury OR  https://www.cdc.gov/steadi/patient.html

## 2023-12-05 NOTE — OB PROVIDER H&P - PROBLEM SELECTOR PLAN 1
32 y/o , EDC , GA 39.3 weeks, evidence of early labor.  -Admit to Labor and Delivery  -Admission Consents obtained  -GBS Positive, ampicillin ordered  -epidural approved by Dr. Quesada  -Expectant management    -discussed with Dr. Dipak Garcia, PAC 34 y/o , EDC , GA 39.3 weeks, evidence of early labor.  -Admit to Labor and Delivery  -Admission Consents obtained  -GBS Positive, ampicillin ordered  -epidural approved by Dr. Quesada  -Expectant management    -discussed with Dr. Dipak Garcia, PAC

## 2023-12-05 NOTE — OB PROVIDER H&P - ASSESSMENT
32 y/o , EDC , GA 39.3 weeks, evidence of early labor.  -Admit to Labor and Delivery  -Admission Consents obtained  -GBS Positive, ampicillin ordered  -epidural approved by Dr. Quesada  -Expectant management    -discussed with Dr. Dipak Garcia, PAC

## 2023-12-06 RX ADMIN — Medication 600 MILLIGRAM(S): at 21:45

## 2023-12-06 RX ADMIN — Medication 600 MILLIGRAM(S): at 08:57

## 2023-12-06 RX ADMIN — Medication 975 MILLIGRAM(S): at 01:20

## 2023-12-06 RX ADMIN — Medication 975 MILLIGRAM(S): at 12:02

## 2023-12-06 RX ADMIN — Medication 975 MILLIGRAM(S): at 06:30

## 2023-12-06 RX ADMIN — Medication 600 MILLIGRAM(S): at 15:40

## 2023-12-06 RX ADMIN — Medication 600 MILLIGRAM(S): at 03:01

## 2023-12-06 RX ADMIN — Medication 975 MILLIGRAM(S): at 12:40

## 2023-12-06 RX ADMIN — Medication 975 MILLIGRAM(S): at 00:23

## 2023-12-06 RX ADMIN — Medication 600 MILLIGRAM(S): at 20:57

## 2023-12-06 RX ADMIN — Medication 600 MILLIGRAM(S): at 15:02

## 2023-12-06 RX ADMIN — Medication 600 MILLIGRAM(S): at 09:38

## 2023-12-06 RX ADMIN — Medication 1 TABLET(S): at 12:03

## 2023-12-06 RX ADMIN — Medication 975 MILLIGRAM(S): at 07:01

## 2023-12-06 RX ADMIN — Medication 600 MILLIGRAM(S): at 03:50

## 2023-12-06 RX ADMIN — Medication 975 MILLIGRAM(S): at 17:44

## 2023-12-06 RX ADMIN — Medication 975 MILLIGRAM(S): at 18:30

## 2023-12-07 VITALS
HEART RATE: 75 BPM | SYSTOLIC BLOOD PRESSURE: 110 MMHG | DIASTOLIC BLOOD PRESSURE: 60 MMHG | TEMPERATURE: 98 F | RESPIRATION RATE: 17 BRPM | OXYGEN SATURATION: 100 %

## 2023-12-07 RX ORDER — IBUPROFEN 200 MG
1 TABLET ORAL
Qty: 0 | Refills: 0 | DISCHARGE
Start: 2023-12-07

## 2023-12-07 RX ORDER — ACETAMINOPHEN 500 MG
3 TABLET ORAL
Qty: 0 | Refills: 0 | DISCHARGE
Start: 2023-12-07

## 2023-12-07 RX ADMIN — Medication 975 MILLIGRAM(S): at 09:02

## 2023-12-07 RX ADMIN — Medication 600 MILLIGRAM(S): at 07:00

## 2023-12-07 RX ADMIN — Medication 600 MILLIGRAM(S): at 06:06

## 2023-12-07 RX ADMIN — Medication 975 MILLIGRAM(S): at 09:40

## 2024-04-08 NOTE — BRIEF OPERATIVE NOTE - COMMENTS
Addendum  created 04/07/24 2027 by Elvi Aviles MD    Clinical Note Signed      
Dictation #[id number]
Denies

## 2024-08-22 NOTE — OB PROVIDER H&P - NSHPPHYSICALEXAM_GEN_ALL_CORE
Pressure Injury 1: sacrum, Stage II   Vital Signs Last 24 Hrs  T(C): 36.9 (05 Dec 2023 06:10), Max: 36.9 (05 Dec 2023 06:10)  T(F): 98.4 (05 Dec 2023 06:10), Max: 98.4 (05 Dec 2023 06:10)  HR: 84 (05 Dec 2023 06:19) (84 - 98)  BP: 111/64 (05 Dec 2023 06:19) (111/64 - 125/57)  BP(mean): --  RR: 16 (05 Dec 2023 06:10) (16 - 16)  SpO2: --    Gen: A/O x3  Abd: Gravid uterus, toco in place   TAS: vertex, posterior placenta,  bpm, images saved in ASOB  FHR: in progress  CTX: q3-5 min   SVE: 4/60/-3  EFW: 3300

## 2024-11-29 DIAGNOSIS — Z30.09 ENCOUNTER FOR OTHER GENERAL COUNSELING AND ADVICE ON CONTRACEPTION: ICD-10-CM

## 2024-11-29 RX ORDER — ETHYNODIOL DIACETATE AND ETHINYL ESTRADIOL 1 MG-35MCG
1-35 KIT ORAL DAILY
Qty: 84 | Refills: 0 | Status: ACTIVE | COMMUNITY
Start: 2024-11-29 | End: 1900-01-01

## 2024-12-03 DIAGNOSIS — Z30.41 ENCOUNTER FOR SURVEILLANCE OF CONTRACEPTIVE PILLS: ICD-10-CM

## 2024-12-03 RX ORDER — ETHYNODIOL DIACETATE AND ETHINYL ESTRADIOL 1 MG-35MCG
1-35 KIT ORAL DAILY
Qty: 84 | Refills: 0 | Status: ACTIVE | COMMUNITY
Start: 2024-12-03 | End: 1900-01-01

## 2025-04-22 ENCOUNTER — NON-APPOINTMENT (OUTPATIENT)
Age: 35
End: 2025-04-22

## 2025-04-22 ENCOUNTER — APPOINTMENT (OUTPATIENT)
Facility: CLINIC | Age: 35
End: 2025-04-22
Payer: COMMERCIAL

## 2025-04-22 VITALS — DIASTOLIC BLOOD PRESSURE: 67 MMHG | SYSTOLIC BLOOD PRESSURE: 101 MMHG

## 2025-04-22 DIAGNOSIS — Z01.419 ENCOUNTER FOR GYNECOLOGICAL EXAMINATION (GENERAL) (ROUTINE) W/OUT ABNORMAL FINDINGS: ICD-10-CM

## 2025-04-22 LAB — HCG UR QL: NEGATIVE

## 2025-04-22 PROCEDURE — 99459 PELVIC EXAMINATION: CPT

## 2025-04-22 PROCEDURE — 81025 URINE PREGNANCY TEST: CPT

## 2025-04-22 PROCEDURE — 99395 PREV VISIT EST AGE 18-39: CPT

## 2025-04-22 PROCEDURE — G0444 DEPRESSION SCREEN ANNUAL: CPT | Mod: 59

## 2025-04-24 LAB — HPV HIGH+LOW RISK DNA PNL CVX: NOT DETECTED

## 2025-04-28 ENCOUNTER — TRANSCRIPTION ENCOUNTER (OUTPATIENT)
Age: 35
End: 2025-04-28

## 2025-04-28 LAB — CYTOLOGY CVX/VAG DOC THIN PREP: NORMAL

## (undated) DEVICE — POSITIONER STRAP ARMBOARD VELCRO TS-30

## (undated) DEVICE — PROTECTOR HEEL / ELBOW FLUFFY

## (undated) DEVICE — GLV 6 PROTEXIS (CREAM) MICRO

## (undated) DEVICE — GOWN XL

## (undated) DEVICE — VACUUM CURETTE BERKLEY OLYMPUS CURVED 9MM

## (undated) DEVICE — LABELS BLANK W PEN

## (undated) DEVICE — DRAPE LIGHT HANDLE COVER (GREEN)

## (undated) DEVICE — VENODYNE/SCD SLEEVE CALF MEDIUM

## (undated) DEVICE — VACUUM CURETTE BUSSE HOSP CURVED 8MM

## (undated) DEVICE — VISITEC 4X4

## (undated) DEVICE — BOTTLE COLLECTION KIT CAP 3SM 2 LG

## (undated) DEVICE — BASIN SET DOUBLE

## (undated) DEVICE — DRAPE TOWEL BLUE 17" X 24"

## (undated) DEVICE — DRSG TELFA 3 X 8

## (undated) DEVICE — TUBING GYRUS ACMI COLLECTION SET 6FT

## (undated) DEVICE — PACK PERI GYN

## (undated) DEVICE — VACUUM CURETTE BUSSE HOSP CURVED 7MM

## (undated) DEVICE — TUBING MEDGYN VACUUM 3/8" COLLECTION SET W/HANDLE

## (undated) DEVICE — GLV 6.5 PROTEXIS (CREAM) MICRO

## (undated) DEVICE — DRSG PAD SANITARY OB

## (undated) DEVICE — PREP BETADINE SPONGE STICKS

## (undated) DEVICE — GOWN LG